# Patient Record
Sex: FEMALE | Race: BLACK OR AFRICAN AMERICAN | NOT HISPANIC OR LATINO | URBAN - METROPOLITAN AREA
[De-identification: names, ages, dates, MRNs, and addresses within clinical notes are randomized per-mention and may not be internally consistent; named-entity substitution may affect disease eponyms.]

---

## 2019-09-12 ENCOUNTER — OUTPATIENT (OUTPATIENT)
Dept: OUTPATIENT SERVICES | Facility: HOSPITAL | Age: 39
LOS: 1 days | End: 2019-09-12

## 2019-09-12 VITALS
RESPIRATION RATE: 16 BRPM | HEART RATE: 83 BPM | OXYGEN SATURATION: 98 % | SYSTOLIC BLOOD PRESSURE: 110 MMHG | HEIGHT: 65 IN | DIASTOLIC BLOOD PRESSURE: 70 MMHG | WEIGHT: 169.98 LBS | TEMPERATURE: 98 F

## 2019-09-12 DIAGNOSIS — Z98.890 OTHER SPECIFIED POSTPROCEDURAL STATES: Chronic | ICD-10-CM

## 2019-09-12 DIAGNOSIS — N84.0 POLYP OF CORPUS UTERI: ICD-10-CM

## 2019-09-12 LAB
ANION GAP SERPL CALC-SCNC: 13 MMO/L — SIGNIFICANT CHANGE UP (ref 7–14)
BASOPHILS # BLD AUTO: 0.04 K/UL — SIGNIFICANT CHANGE UP (ref 0–0.2)
BASOPHILS NFR BLD AUTO: 0.6 % — SIGNIFICANT CHANGE UP (ref 0–2)
BUN SERPL-MCNC: 10 MG/DL — SIGNIFICANT CHANGE UP (ref 7–23)
CALCIUM SERPL-MCNC: 9.6 MG/DL — SIGNIFICANT CHANGE UP (ref 8.4–10.5)
CHLORIDE SERPL-SCNC: 104 MMOL/L — SIGNIFICANT CHANGE UP (ref 98–107)
CO2 SERPL-SCNC: 25 MMOL/L — SIGNIFICANT CHANGE UP (ref 22–31)
CREAT SERPL-MCNC: 1.18 MG/DL — SIGNIFICANT CHANGE UP (ref 0.5–1.3)
EOSINOPHIL # BLD AUTO: 0.11 K/UL — SIGNIFICANT CHANGE UP (ref 0–0.5)
EOSINOPHIL NFR BLD AUTO: 1.6 % — SIGNIFICANT CHANGE UP (ref 0–6)
GLUCOSE SERPL-MCNC: 71 MG/DL — SIGNIFICANT CHANGE UP (ref 70–99)
HCT VFR BLD CALC: 38.9 % — SIGNIFICANT CHANGE UP (ref 34.5–45)
HGB BLD-MCNC: 12.5 G/DL — SIGNIFICANT CHANGE UP (ref 11.5–15.5)
IMM GRANULOCYTES NFR BLD AUTO: 0.3 % — SIGNIFICANT CHANGE UP (ref 0–1.5)
LYMPHOCYTES # BLD AUTO: 3.5 K/UL — HIGH (ref 1–3.3)
LYMPHOCYTES # BLD AUTO: 52 % — HIGH (ref 13–44)
MCHC RBC-ENTMCNC: 27.4 PG — SIGNIFICANT CHANGE UP (ref 27–34)
MCHC RBC-ENTMCNC: 32.1 % — SIGNIFICANT CHANGE UP (ref 32–36)
MCV RBC AUTO: 85.1 FL — SIGNIFICANT CHANGE UP (ref 80–100)
MONOCYTES # BLD AUTO: 0.5 K/UL — SIGNIFICANT CHANGE UP (ref 0–0.9)
MONOCYTES NFR BLD AUTO: 7.4 % — SIGNIFICANT CHANGE UP (ref 2–14)
NEUTROPHILS # BLD AUTO: 2.56 K/UL — SIGNIFICANT CHANGE UP (ref 1.8–7.4)
NEUTROPHILS NFR BLD AUTO: 38.1 % — LOW (ref 43–77)
NRBC # FLD: 0 K/UL — SIGNIFICANT CHANGE UP (ref 0–0)
PLATELET # BLD AUTO: 236 K/UL — SIGNIFICANT CHANGE UP (ref 150–400)
PMV BLD: 12.3 FL — SIGNIFICANT CHANGE UP (ref 7–13)
POTASSIUM SERPL-MCNC: 4.4 MMOL/L — SIGNIFICANT CHANGE UP (ref 3.5–5.3)
POTASSIUM SERPL-SCNC: 4.4 MMOL/L — SIGNIFICANT CHANGE UP (ref 3.5–5.3)
RBC # BLD: 4.57 M/UL — SIGNIFICANT CHANGE UP (ref 3.8–5.2)
RBC # FLD: 14 % — SIGNIFICANT CHANGE UP (ref 10.3–14.5)
SODIUM SERPL-SCNC: 142 MMOL/L — SIGNIFICANT CHANGE UP (ref 135–145)
WBC # BLD: 6.73 K/UL — SIGNIFICANT CHANGE UP (ref 3.8–10.5)
WBC # FLD AUTO: 6.73 K/UL — SIGNIFICANT CHANGE UP (ref 3.8–10.5)

## 2019-09-12 RX ORDER — SODIUM CHLORIDE 9 MG/ML
1000 INJECTION, SOLUTION INTRAVENOUS
Refills: 0 | Status: DISCONTINUED | OUTPATIENT
Start: 2019-10-02 | End: 2019-10-20

## 2019-09-12 NOTE — H&P PST ADULT - NEUROLOGICAL DETAILS
responds to verbal commands/sensation intact/alert and oriented x 3/responds to pain/normal strength

## 2019-09-12 NOTE — H&P PST ADULT - HISTORY OF PRESENT ILLNESS
Pt is a 39 yr old female scheduled for Hysteroscopy with Symphion with Dr Jones 10/2/19. Pt found to have uterine polyp with routine PAP - pt denies bleeding or pain

## 2019-09-12 NOTE — H&P PST ADULT - MUSCULOSKELETAL
detailed exam no joint warmth/no joint erythema/ROM intact/normal strength/no joint swelling details…

## 2019-10-02 ENCOUNTER — OUTPATIENT (OUTPATIENT)
Dept: OUTPATIENT SERVICES | Facility: HOSPITAL | Age: 39
LOS: 1 days | Discharge: HOME | End: 2019-10-02
Payer: COMMERCIAL

## 2019-10-02 VITALS
OXYGEN SATURATION: 99 % | DIASTOLIC BLOOD PRESSURE: 70 MMHG | RESPIRATION RATE: 16 BRPM | SYSTOLIC BLOOD PRESSURE: 127 MMHG | HEART RATE: 67 BPM

## 2019-10-02 VITALS
OXYGEN SATURATION: 100 % | HEIGHT: 65 IN | WEIGHT: 169.98 LBS | DIASTOLIC BLOOD PRESSURE: 74 MMHG | HEART RATE: 69 BPM | TEMPERATURE: 98 F | RESPIRATION RATE: 16 BRPM | SYSTOLIC BLOOD PRESSURE: 118 MMHG

## 2019-10-02 DIAGNOSIS — Z98.890 OTHER SPECIFIED POSTPROCEDURAL STATES: Chronic | ICD-10-CM

## 2019-10-02 DIAGNOSIS — N84.0 POLYP OF CORPUS UTERI: ICD-10-CM

## 2019-10-02 LAB — HCG UR QL: NEGATIVE — SIGNIFICANT CHANGE UP

## 2019-10-02 PROCEDURE — 88305 TISSUE EXAM BY PATHOLOGIST: CPT | Mod: 26

## 2019-10-02 RX ORDER — SODIUM CHLORIDE 9 MG/ML
1000 INJECTION, SOLUTION INTRAVENOUS
Refills: 0 | Status: DISCONTINUED | OUTPATIENT
Start: 2019-10-02 | End: 2019-10-20

## 2019-10-02 NOTE — ASU DISCHARGE PLAN (ADULT/PEDIATRIC) - PAIN MANAGEMENT
Take over the counter pain medication You received IV Tylenol for pain management at 9:35 AM. Please DO NOT take any Tylenol (Acetaminophen) containing products, such as Vicodin, Percocet, Excedrin, and cold medications for the next 6 hours (until 3:35 PM). DO NOT TAKE MORE THAN 3000 MG OF TYLENOL in a 24 hour period.  ************************** You received IV Toradol for pain management at 10 AM. Please DO NOT take Motrin/Ibuprofen/Advil/Aleve/NSAIDs (Non-Steroidal Anti-Inflammatory Drugs) for the next 6 hours (until 4 PM)./Take over the counter pain medication

## 2019-10-02 NOTE — ASU DISCHARGE PLAN (ADULT/PEDIATRIC) - ACTIVITY LEVEL
No tub baths/No intercourse/Nothing per vagina/No douching/No tampons No tub baths/Nothing per vagina/No tampons/for 2 weeks/No douching/No heavy lifting/No intercourse

## 2019-10-02 NOTE — ASU DISCHARGE PLAN (ADULT/PEDIATRIC) - CARE PROVIDER_API CALL
Elysia Jones)  Obstetrics and Gynecology  [address], [address]  [place] [address]  Phone: [phone]  Fax: [phone]  Follow Up Time: 2 weeks

## 2019-10-02 NOTE — ASU DISCHARGE PLAN (ADULT/PEDIATRIC) - CALL YOUR DOCTOR IF YOU HAVE ANY OF THE FOLLOWING:
Nausea and vomiting that does not stop/Unable to urinate/Increased irritability or sluggishness/Inability to tolerate liquids or foods/Fever greater than (need to indicate Fahrenheit or Celsius)/Bleeding that does not stop/Pain not relieved by Medications

## 2019-10-09 LAB — SURGICAL PATHOLOGY STUDY: SIGNIFICANT CHANGE UP

## 2023-11-28 NOTE — H&P PST ADULT - LYMPH NODES
Community palliative care will provided by:    [address] [facility] [address], [address]  Phone: [phone]  Fax: [phone] detailed exam

## 2024-04-01 NOTE — H&P PST ADULT - NSSUBSTANCEUSE_GEN_ALL_CORE_SD
"[facility]                                     Progress Note    Patient Name: Sherie Wilson  Date: 4/1/2024         Service Type: Individual      Session Start Time: 12:15pm  Session End Time: 1pm     Session Length: 45 minutes     Session #: 32    Attendees: Client attended alone    Service Modality:  In person         DATA  Extended Session (53+ minutes): No  Interactive Complexity: No  Crisis: No         Progress Since Last Session (Related to Symptoms / Goals / Homework):   Symptoms: No change (mood, anxiety)    Homework: Achieved / completed to satisfaction      Episode of Care Goals: Satisfactory progress - ACTION (Actively working towards change); Intervened by reinforcing change plan / affirming steps taken     Current / Ongoing Stressors and Concerns:   Today, patient reports that things have been going fairly well.  Session was spent exploring patient's interpersonal relationship with a colleague.  Patient reflected on the following question from a book that she is reading: \"Can you tell the difference between how you feel about someone versus how they make you feel.\"  Reflected on her own emotional experience and identified patterns of mind reading that are not serving her.  Patient and therapist discussed exploring this relationship through a grief informed lens in coming sessions.  In the coming week, patient will reflect on loss.  Patient will validate her needs / emotions.  Patient will continue to read / listen to podcasts.       Treatment Objective(s) Addressed in This Session:   Patient will cultivate self acceptance and self love  use cognitive strategies identified in therapy to challenge anxious thoughts  Identify negative self-talk and behaviors: challenge core beliefs, myths, and actions  Patient will compile a list of boundaries she would like to set with others  Patient will learn and utilize assertive communication skills         Intervention:   CBT: cognitive " reframing  Interpersonal Therapy: rapport building  Motivational Interviewing: OARS skills, stages of change  Solution Focused: strengths-based    Assessments completed prior to visit:  The following assessments were completed by patient for this visit:  PHQ9:       10/9/2023    12:01 PM 11/20/2023    12:02 PM 11/29/2023     7:42 AM 1/8/2024    11:06 AM 1/15/2024    12:07 PM 3/11/2024    12:08 PM 3/25/2024    12:08 PM   PHQ-9 SCORE   PHQ-9 Total Score MyChart 0 0 2 (Minimal depression) 0 0 4 (Minimal depression) 1 (Minimal depression)   PHQ-9 Total Score 0 0 2 0 0 4 1       GAD7:       12/5/2019    11:00 AM 5/8/2023     2:10 PM 5/30/2023    11:01 AM 6/16/2023    10:16 AM 1/8/2024    11:08 AM   LA-7 SCORE   Total Score  6 (mild anxiety) 7 (mild anxiety) 7 (mild anxiety)    Total Score 3 6 7 7    7 0     PROMIS 10-Global Health (only subscores and total score):       5/30/2023    11:28 AM 8/30/2023    10:38 AM 9/6/2023    11:28 AM 9/25/2023    12:01 PM 1/8/2024    11:08 AM 1/15/2024    12:08 PM   PROMIS-10 Scores Only   Global Mental Health Score 15 16    16 12 17 17 19    19   Global Physical Health Score 17 17    17 13 18 18 18    18   PROMIS TOTAL - SUBSCORES 32 33    33 25 35 35 37    37         ASSESSMENT: Current Emotional / Mental Status (status of significant symptoms):   Risk status (Self / Other harm or suicidal ideation)   Patient denies current fears or concerns for personal safety.   Patient denies current or recent suicidal ideation or behaviors.   Patient denies current or recent homicidal ideation or behaviors.   Patient denies current or recent self injurious behavior or ideation.   Patient denies other safety concerns.   Patient reports there has been no change in risk factors since their last session.     Patient reports there has been no change in protective factors since their last session.     Recommended that patient call 911 or go to the local ED should there be a change in any of these risk  factors.     Appearance:   Appropriate    Eye Contact:   Good    Psychomotor Behavior: Normal    Attitude:   Cooperative    Orientation:   All   Speech    Rate / Production: Normal     Volume:  Normal    Mood:    Normal   Affect:    Appropriate    Thought Content:  Clear    Thought Form:  Coherent  Logical    Insight:    Good      Medication Review:   No changes to current psychiatric medication(s)     Medication Compliance:   Yes     Changes in Health Issues:   Yes: dental work     Chemical Use Review:   Substance Use: Chemical use reviewed, no active concerns identified      Tobacco Use: No current tobacco use.      Diagnosis:  1. Adjustment disorder with mixed anxiety and depressed mood                        Collateral Reports Completed:   Not Applicable    PLAN: (Patient Tasks / Therapist Tasks / Other)  In the coming week, patient will reflect on loss.    Patient will validate her needs / emotions.    Patient will continue to read / listen to podcasts.      Braydon Flores, [facility]  4/1/2024    ______________________________________________________________________    Individual Treatment Plan    Patient's Name: Sherie Wilson  YOB: 1946    Date of Creation: 6/16/2023  Date Treatment Plan Last Reviewed/Revised: 3/11/2024    DSM5 Diagnoses: Adjustment Disorders  309.28 (F43.23) With mixed anxiety and depressed mood  Psychosocial / Contextual Factors: strong bharathi, medication management with PCP, hx in music education, hx of therapy, relational stressors with colleague, some health concerns.   PROMIS (reviewed every 90 days):       5/30/2023    11:28 AM 8/30/2023    10:38 AM 9/6/2023    11:28 AM 9/25/2023    12:01 PM 1/8/2024    11:08 AM   PROMIS-10 Scores Only   Global Mental Health Score 15 16    16 12 17 17   Global Physical Health Score 17 17    17 13 18 18   PROMIS TOTAL - SUBSCORES 32 33    33 25 35 35       Referral / Collaboration:  Referral to another professional/service is not indicated at  "this time..    Anticipated number of session for this episode of care:  25+  Anticipation frequency of session: Weekly  Anticipated Duration of each session: 53 or more minutes  Treatment plan will be reviewed in 90 days or when goals have been changed.       MeasurableTreatment Goal(s) related to diagnosis / functional impairment(s)  Goal 1: Patient will decrease symptoms of anxiety and depression and increase coping skills for adjustment as evidenced by decreased PHQ-9 scores and LA-7 scores.  \"I will know I've met my goal when I feel more like myself, stop crying so much, and feel confident.\"    Objective #A (Patient Action)    Patient will cultivate self-acceptance and self-love.    Patient will identify her values and strengths.  Patient will improve ability to name and identify her emotions.  Status: continued: 12/11/2023, 3/11/2024    Objective #B  Patient will learn and utilize assertive communication skills.  Patient will  compile a list of boundaries she would like to set with others .  Status: continue: 12/11/2023, 3/11/2024    Objective #C  Patient will use cognitive strategies identified in therapy to challenge anxious thoughts  Identify negative self-talk and behaviors: challenge core beliefs, myths, and actions.  Status: continued: 12/11/2023, 3/11/2024    Intervention(s)  Therapist will teach CBT skills to challenge cognitive distortions and core beliefs.  Therapist will teach and model positive self-talk behaviors.  Therapist will use psychodynamic approaches to explore early attachments and schemas.  Therapist will teach DBT mindfulness and emotion regulation skills.    Therapist will teach ACT skills to engage in value-based living.        Patient has reviewed and agreed to the above plan.      AMELIA De Leon, [facility]  3/11/2024  " caffeine